# Patient Record
Sex: FEMALE | HISPANIC OR LATINO
[De-identification: names, ages, dates, MRNs, and addresses within clinical notes are randomized per-mention and may not be internally consistent; named-entity substitution may affect disease eponyms.]

---

## 2017-08-09 PROBLEM — Z00.00 ENCOUNTER FOR PREVENTIVE HEALTH EXAMINATION: Status: ACTIVE | Noted: 2017-08-09

## 2017-09-20 ENCOUNTER — APPOINTMENT (OUTPATIENT)
Dept: PULMONOLOGY | Facility: CLINIC | Age: 62
End: 2017-09-20
Payer: COMMERCIAL

## 2017-09-20 ENCOUNTER — INPATIENT (INPATIENT)
Facility: HOSPITAL | Age: 62
LOS: 2 days | Discharge: HOME | DRG: 253 | End: 2017-09-23
Attending: INTERNAL MEDICINE | Admitting: INTERNAL MEDICINE
Payer: COMMERCIAL

## 2017-09-20 ENCOUNTER — APPOINTMENT (OUTPATIENT)
Dept: HEART AND VASCULAR | Facility: CLINIC | Age: 62
End: 2017-09-20

## 2017-09-20 VITALS
SYSTOLIC BLOOD PRESSURE: 90 MMHG | HEART RATE: 75 BPM | DIASTOLIC BLOOD PRESSURE: 60 MMHG | WEIGHT: 154 LBS | OXYGEN SATURATION: 97 %

## 2017-09-20 VITALS
WEIGHT: 154.1 LBS | HEIGHT: 63 IN | DIASTOLIC BLOOD PRESSURE: 80 MMHG | RESPIRATION RATE: 17 BRPM | HEART RATE: 65 BPM | OXYGEN SATURATION: 96 % | TEMPERATURE: 98 F | SYSTOLIC BLOOD PRESSURE: 126 MMHG

## 2017-09-20 VITALS
OXYGEN SATURATION: 97 % | SYSTOLIC BLOOD PRESSURE: 90 MMHG | DIASTOLIC BLOOD PRESSURE: 60 MMHG | HEART RATE: 75 BPM | WEIGHT: 154 LBS

## 2017-09-20 DIAGNOSIS — J47.9 BRONCHIECTASIS, UNCOMPLICATED: ICD-10-CM

## 2017-09-20 DIAGNOSIS — R58 HEMORRHAGE, NOT ELSEWHERE CLASSIFIED: ICD-10-CM

## 2017-09-20 DIAGNOSIS — I27.2 OTHER SECONDARY PULMONARY HYPERTENSION: ICD-10-CM

## 2017-09-20 DIAGNOSIS — R04.2 HEMOPTYSIS: ICD-10-CM

## 2017-09-20 DIAGNOSIS — Z29.9 ENCOUNTER FOR PROPHYLACTIC MEASURES, UNSPECIFIED: ICD-10-CM

## 2017-09-20 DIAGNOSIS — R63.8 OTHER SYMPTOMS AND SIGNS CONCERNING FOOD AND FLUID INTAKE: ICD-10-CM

## 2017-09-20 LAB
ALBUMIN SERPL ELPH-MCNC: 4.4 G/DL — SIGNIFICANT CHANGE UP (ref 3.3–5)
ALP SERPL-CCNC: 71 U/L — SIGNIFICANT CHANGE UP (ref 40–120)
ALT FLD-CCNC: 24 U/L — SIGNIFICANT CHANGE UP (ref 10–45)
ANION GAP SERPL CALC-SCNC: 13 MMOL/L — SIGNIFICANT CHANGE UP (ref 5–17)
APTT BLD: 28.6 SEC — SIGNIFICANT CHANGE UP (ref 27.5–37.4)
AST SERPL-CCNC: 25 U/L — SIGNIFICANT CHANGE UP (ref 10–40)
BASOPHILS NFR BLD AUTO: 0.2 % — SIGNIFICANT CHANGE UP (ref 0–2)
BILIRUB SERPL-MCNC: 0.3 MG/DL — SIGNIFICANT CHANGE UP (ref 0.2–1.2)
BLD GP AB SCN SERPL QL: NEGATIVE — SIGNIFICANT CHANGE UP
BUN SERPL-MCNC: 13 MG/DL — SIGNIFICANT CHANGE UP (ref 7–23)
CALCIUM SERPL-MCNC: 9.4 MG/DL — SIGNIFICANT CHANGE UP (ref 8.4–10.5)
CHLORIDE SERPL-SCNC: 102 MMOL/L — SIGNIFICANT CHANGE UP (ref 96–108)
CO2 SERPL-SCNC: 26 MMOL/L — SIGNIFICANT CHANGE UP (ref 22–31)
CREAT SERPL-MCNC: 0.66 MG/DL — SIGNIFICANT CHANGE UP (ref 0.5–1.3)
EOSINOPHIL NFR BLD AUTO: 3.6 % — SIGNIFICANT CHANGE UP (ref 0–6)
GLUCOSE SERPL-MCNC: 88 MG/DL — SIGNIFICANT CHANGE UP (ref 70–99)
HCT VFR BLD CALC: 38.4 % — SIGNIFICANT CHANGE UP (ref 34.5–45)
HGB BLD-MCNC: 12.6 G/DL — SIGNIFICANT CHANGE UP (ref 11.5–15.5)
INR BLD: 0.93 — SIGNIFICANT CHANGE UP (ref 0.88–1.16)
LACTATE SERPL-SCNC: 0.8 MMOL/L — SIGNIFICANT CHANGE UP (ref 0.5–2)
LYMPHOCYTES # BLD AUTO: 39.2 % — SIGNIFICANT CHANGE UP (ref 13–44)
MCHC RBC-ENTMCNC: 29.7 PG — SIGNIFICANT CHANGE UP (ref 27–34)
MCHC RBC-ENTMCNC: 32.8 G/DL — SIGNIFICANT CHANGE UP (ref 32–36)
MCV RBC AUTO: 90.6 FL — SIGNIFICANT CHANGE UP (ref 80–100)
MONOCYTES NFR BLD AUTO: 8.1 % — SIGNIFICANT CHANGE UP (ref 2–14)
NEUTROPHILS NFR BLD AUTO: 48.9 % — SIGNIFICANT CHANGE UP (ref 43–77)
PLATELET # BLD AUTO: 174 K/UL — SIGNIFICANT CHANGE UP (ref 150–400)
POTASSIUM SERPL-MCNC: 3.8 MMOL/L — SIGNIFICANT CHANGE UP (ref 3.5–5.3)
POTASSIUM SERPL-SCNC: 3.8 MMOL/L — SIGNIFICANT CHANGE UP (ref 3.5–5.3)
PROT SERPL-MCNC: 7.5 G/DL — SIGNIFICANT CHANGE UP (ref 6–8.3)
PROTHROM AB SERPL-ACNC: 10.3 SEC — SIGNIFICANT CHANGE UP (ref 9.8–12.7)
RBC # BLD: 4.24 M/UL — SIGNIFICANT CHANGE UP (ref 3.8–5.2)
RBC # FLD: 14.1 % — SIGNIFICANT CHANGE UP (ref 10.3–16.9)
RH IG SCN BLD-IMP: POSITIVE — SIGNIFICANT CHANGE UP
SODIUM SERPL-SCNC: 141 MMOL/L — SIGNIFICANT CHANGE UP (ref 135–145)
WBC # BLD: 5.3 K/UL — SIGNIFICANT CHANGE UP (ref 3.8–10.5)
WBC # FLD AUTO: 5.3 K/UL — SIGNIFICANT CHANGE UP (ref 3.8–10.5)

## 2017-09-20 PROCEDURE — 99285 EMERGENCY DEPT VISIT HI MDM: CPT

## 2017-09-20 PROCEDURE — 99245 OFF/OP CONSLTJ NEW/EST HI 55: CPT

## 2017-09-20 PROCEDURE — 99223 1ST HOSP IP/OBS HIGH 75: CPT | Mod: GC

## 2017-09-20 PROCEDURE — 71010: CPT | Mod: 26

## 2017-09-20 RX ORDER — PANTOPRAZOLE SODIUM 20 MG/1
40 TABLET, DELAYED RELEASE ORAL
Qty: 0 | Refills: 0 | Status: DISCONTINUED | OUTPATIENT
Start: 2017-09-20 | End: 2017-09-23

## 2017-09-20 RX ORDER — OMEPRAZOLE 10 MG/1
0 CAPSULE, DELAYED RELEASE ORAL
Qty: 0 | Refills: 0 | COMMUNITY

## 2017-09-20 NOTE — ED ADULT NURSE NOTE - OBJECTIVE STATEMENT
Pt presents to ED c/o cough. Pt presents to ED c/o cough. Interviewed pt with  Donaldo [id number]. Pt reports cough for at least 9 months with productive yellow sputum, pt with two episodes of blood in sputum once in January and once yesterday. Pt also reporting "shock like" pain to mid upper back, constant for months, not related to coughing per pt. Pt also endorses intermittent CP, last episode woke pt up in the middle of the night two nights ago, pain to bilateral chest walls, though none at this time. Pt has had multiple pulm work ups, sent in for further testing. Pt denies fever, chills, dizziness/lightheadedness, SOB, N/V/D, difficulty voiding. Pt presents in NAD speaking full sentences ambulatory through triage. Mask applied in triage, pt placed on airborne precautions in bed 15.

## 2017-09-20 NOTE — H&P ADULT - ATTENDING COMMENTS
[id number] used for interpretation; pt seen and examined; reviewed vs, labs, CXR   pt a/f episodes of hemoptysis occuring initially circa Jan-Feb 2017 and then recurring twice this month. pt in addition reports having productive cough. pt was dxd this year w/ PHTN and bronchiectass; pt reports on ROS having sharp pain in chest constantly (electricity i that radiates from front to back  PE findings as above except pt w/ mild bibasilar crackles on lung exam  a/p:   1. Hemoptysis: on isolation r/o TB; follow up AFBctxs and Quantiferon gold assay; follow up pulm recs, possible bronchoscopy pending  2. chest pain: pt in NAD; sxs described as constant, " electricity" like sensation  occuring in setting of pulm conditions; will check EKG, monitor for worsening sxs  3. PHTN: echo ordered  4. bronchiectasis: follow up pulm recs

## 2017-09-20 NOTE — H&P ADULT - HISTORY OF PRESENT ILLNESS
63 y/o F, Indian speaker w/ a PMHx of pulmonary hypertension and bronchiectasis (dx Jan 2017) presenting w/ a 2 day hx of hemoptysis. Pt reports that she was having a dry cough and associated left sided back pain near the interscapular region that led to having an episode of blood tinged sputum. Pt did not report having any other associated symptoms such as fever/chills, night sweats, weight loss and shortness of breath. Pt went to see Dr. Chaidez this morning who recommended pt come to the ED. Pt reports she had a similar episode in March for which she went to [facility] and had a CT scan which showed bronchiectasis (CT imaging will be uploaded by Dr. Chaidez). Pt does not recall ever having been worked up for TB or having a bronchoscopy. Pt does endorse having a grandmother who may of had TB for which she had brief contact with several years ago in [facility]. Pt does not report any recent travel except a trip to [facility] in March 2017. Denies any recent abx use or any sick contacts. Pt does not endorse any hx of tobacco use.     Currently on ROS pt denies chest pain, shortness of breath, abdominal pain, fever, chills, diarrhea/constipation, headache/dizziness.     ED Course:   T: 98.2, HR: 65, BP: 126.80, RR: 17 O2: 96% 61 y/o F, Bhutanese speaker w/ a PMHx of pulmonary hypertension and bronchiectasis (dx March 2017) presenting w/ a 2 day hx of hemoptysis. Pt reports that she was having a dry cough and associated left sided back pain near the interscapular region that led to having an episode of blood tinged sputum. Pt did not report having any other associated symptoms such as fever/chills, night sweats, weight loss and shortness of breath. Pt went to see Dr. Chaidez this morning who recommended pt come to the ED. Pt reports she had a similar episode in March for which she went to [facility] and had a CT scan which showed bronchiectasis (CT imaging will be uploaded by Dr. Chaidez). Pt does not recall ever having been worked up for TB or having a bronchoscopy. Pt does endorse having a grandmother who may of had TB for which she had brief contact with several years ago in [facility]. Pt does not report any recent travel except a trip to [facility] in March 2017. Denies any recent abx use or any sick contacts. Pt does not endorse any hx of tobacco use.     Currently on ROS pt denies chest pain, shortness of breath, abdominal pain, fever, chills, diarrhea/constipation, headache/dizziness.     ED Course:   T: 98.2, HR: 65, BP: 126/80, RR: 17 O2: 96%

## 2017-09-20 NOTE — ED PROVIDER NOTE - MEDICAL DECISION MAKING DETAILS
62F with above PMHx who p/w hemoptysis yesterday, now resolved. Seen by Pulm in office (Dr. Chaidez) and sent to ER for admission for r/o ARABELLA/TB. Pt well-appearing, VSS, no resp distress. Pt placed on airborne precautions, labs, CXR, will admit to hospitalist for further w/u. Pt stable for the floor.

## 2017-09-20 NOTE — H&P ADULT - NSHPSOCIALHISTORY_GEN_ALL_CORE
Denies tobacco  Denies alcohol  Denies IV and recreational drug use. Denies tobacco  drinks alcohol rarely   Denies IV and recreational drug use.  works as home health aide

## 2017-09-20 NOTE — H&P ADULT - PROBLEM SELECTOR PLAN 4
-Regular Diet  -Replete lytes for K>4, Mg>2 Pt w/ CT scan of chest in March 2017 showing bronchiectasis.   -awaiting CT scan to be uploaded.

## 2017-09-20 NOTE — H&P ADULT - PROBLEM SELECTOR PLAN 1
Pt an episode of Pt w/ hemoptysis x2 episodes who was advised to present to the ED. Pt had similar episode in March for which she is unsure if she had TB workup. Low concern for TB  given pt w/ normal cxray and CT scan consistent w/ Bronchiectasis. Hemoptysis likely 2/2 to ARABELLA.  -Isolation  -Sputum culture and gram stain  -f/u on CT chest imaging from March 20,2017. Dr. Chaidez will upload imaging.   -pt to likely have bronchoscopy. will make NPO at midnight. Pt w/ hemoptysis x2 episodes who was advised to present to the ED. Pt had similar episode in March for which she is unsure if she had TB workup. Low concern for TB  given pt w/ normal cxray and CT scan consistent w/ Bronchiectasis. Hemoptysis likely 2/2 to ARABELLA.  -Isolation- airborn  -Sputum culture for AFB x3 (need to 24 hours apart) and gram stain  -f/u Quantiferon gold   -f/u w/ Dr. Chaidez in AM in regards to when pt will likely have a bronch  -f/u on CT chest imaging from March 20,2017. Dr. Chaidez will upload imaging.

## 2017-09-20 NOTE — H&P ADULT - PROBLEM SELECTOR PLAN 3
Pt w/ CT scan of chest in March 2017 showing bronchiectasis.   -awaiting CT scan to be uploaded. Pt w/ reported hx of pulmonary hypertension for which she is not on any medication. Likely idiopathic vs secondary to intrinsic lung pathology.   -will obtain echo to evaluate progression of pulmonary hypertension.

## 2017-09-20 NOTE — H&P ADULT - NSHPLABSRESULTS_GEN_ALL_CORE
.  LABS:                         12.6   5.3   )-----------( 174      ( 20 Sep 2017 16:54 )             38.4     09-20    141  |  102  |  13  ----------------------------<  88  3.8   |  26  |  0.66    Ca    9.4      20 Sep 2017 16:54    TPro  7.5  /  Alb  4.4  /  TBili  0.3  /  DBili  x   /  AST  25  /  ALT  24  /  AlkPhos  71  09-20    PT/INR - ( 20 Sep 2017 16:54 )   PT: 10.3 sec;   INR: 0.93          PTT - ( 20 Sep 2017 16:54 )  PTT:28.6 sec          Lactate, Blood: 0.8 mmoL/L (09-20 @ 16:54)      RADIOLOGY, EKG & ADDITIONAL TESTS: Reviewed.

## 2017-09-20 NOTE — H&P ADULT - NSHPPHYSICALEXAM_GEN_ALL_CORE
.  VITAL SIGNS:  T(C): 36.9 (09-20-17 @ 18:12), Max: 36.9 (09-20-17 @ 18:12)  T(F): 98.5 (09-20-17 @ 18:12), Max: 98.5 (09-20-17 @ 18:12)  HR: 71 (09-20-17 @ 18:12) (65 - 71)  BP: 132/85 (09-20-17 @ 18:12) (126/80 - 132/85)  BP(mean): --  RR: 18 (09-20-17 @ 18:12) (17 - 18)  SpO2: 97% (09-20-17 @ 18:12) (96% - 97%)  Wt(kg): --    PHYSICAL EXAM:    Constitutional: WDWN resting comfortably in bed; NAD  Head: NC/AT  Eyes: PERRL, EOMI, anicteric sclera  ENT: no nasal discharge; uvula midline, no oropharyngeal erythema or exudates; MMM  Neck: supple; no JVD or thyromegaly  Respiratory: CTA B/L; no W/R/R, no retractions  Cardiac: +S1/S2; RRR; no M/R/G; PMI non-displaced  Gastrointestinal: abdomen soft, NT/ND; no rebound or guarding; +BSx4  Extremities: WWP, no clubbing or cyanosis; no peripheral edema  Musculoskeletal: NROM x4; no joint swelling, tenderness or erythema  Vascular: 2+ DP/PT pulses B/L  Neurologic: AAOx3; CNII-XII grossly intact; no focal deficits

## 2017-09-20 NOTE — H&P ADULT - PROBLEM SELECTOR PLAN 2
Pt w/ reported hx of pulmonary hypertension for which she is not on any medication. Likely idiopathic in nature.   -will obtain echo to evaluate progression of pulmonary hypertension. Pt w/ reported hx of pulmonary hypertension for which she is not on any medication. Likely idiopathic vs secondary to intrinsic lung pathology.   -will obtain echo to evaluate progression of pulmonary hypertension. appears related to pulm issues; will check EKG

## 2017-09-20 NOTE — H&P ADULT - FAMILY HISTORY
No pertinent family history in first degree relatives Grandparent  Still living? No  Family history of tuberculosis, Age at diagnosis: Age Unknown

## 2017-09-20 NOTE — H&P ADULT - ASSESSMENT
61 y/o F w/ a PMHx of pulmonary hypertension and bronchiectasis presenting w/ an episode of hemoptysis.

## 2017-09-20 NOTE — ED ADULT TRIAGE NOTE - OTHER COMPLAINTS
pt c.o bloody sputum and cough, sent from pmd for for adm to hospital r/o ARABELLA/TB. mask applied in triage.

## 2017-09-20 NOTE — ED PROVIDER NOTE - OBJECTIVE STATEMENT
62F with h/o PHTN and bronchiectasis who was sent in by Dr. Chaidez for hemoptysis and r/o ARABELLA/TB. Pt states that last January pt had a sharp pain in her back followed by coughing up blood and clots. SHe was worked up at OSH. Yesterday, pt has cough with blood tinged sputum which has resolved today. no CP or SOb. +exposure to GM who reportedly  form TB.  Pt was sent in by Dr. Chaidez for admission, she gave him a CD of a CT which she has had done and which will be uploaded.

## 2017-09-21 DIAGNOSIS — R07.89 OTHER CHEST PAIN: ICD-10-CM

## 2017-09-21 LAB
ANION GAP SERPL CALC-SCNC: 11 MMOL/L — SIGNIFICANT CHANGE UP (ref 5–17)
BUN SERPL-MCNC: 13 MG/DL — SIGNIFICANT CHANGE UP (ref 7–23)
CALCIUM SERPL-MCNC: 9.6 MG/DL — SIGNIFICANT CHANGE UP (ref 8.4–10.5)
CHLORIDE SERPL-SCNC: 104 MMOL/L — SIGNIFICANT CHANGE UP (ref 96–108)
CO2 SERPL-SCNC: 26 MMOL/L — SIGNIFICANT CHANGE UP (ref 22–31)
CREAT SERPL-MCNC: 0.74 MG/DL — SIGNIFICANT CHANGE UP (ref 0.5–1.3)
GLUCOSE SERPL-MCNC: 89 MG/DL — SIGNIFICANT CHANGE UP (ref 70–99)
HCT VFR BLD CALC: 39.9 % — SIGNIFICANT CHANGE UP (ref 34.5–45)
HGB BLD-MCNC: 13 G/DL — SIGNIFICANT CHANGE UP (ref 11.5–15.5)
MAGNESIUM SERPL-MCNC: 2.2 MG/DL — SIGNIFICANT CHANGE UP (ref 1.6–2.6)
MCHC RBC-ENTMCNC: 29.9 PG — SIGNIFICANT CHANGE UP (ref 27–34)
MCHC RBC-ENTMCNC: 32.6 G/DL — SIGNIFICANT CHANGE UP (ref 32–36)
MCV RBC AUTO: 91.7 FL — SIGNIFICANT CHANGE UP (ref 80–100)
PHOSPHATE SERPL-MCNC: 3.8 MG/DL — SIGNIFICANT CHANGE UP (ref 2.5–4.5)
PLATELET # BLD AUTO: 175 K/UL — SIGNIFICANT CHANGE UP (ref 150–400)
POTASSIUM SERPL-MCNC: 4.2 MMOL/L — SIGNIFICANT CHANGE UP (ref 3.5–5.3)
POTASSIUM SERPL-SCNC: 4.2 MMOL/L — SIGNIFICANT CHANGE UP (ref 3.5–5.3)
RBC # BLD: 4.35 M/UL — SIGNIFICANT CHANGE UP (ref 3.8–5.2)
RBC # FLD: 14.3 % — SIGNIFICANT CHANGE UP (ref 10.3–16.9)
SODIUM SERPL-SCNC: 141 MMOL/L — SIGNIFICANT CHANGE UP (ref 135–145)
WBC # BLD: 4.7 K/UL — SIGNIFICANT CHANGE UP (ref 3.8–10.5)
WBC # FLD AUTO: 4.7 K/UL — SIGNIFICANT CHANGE UP (ref 3.8–10.5)

## 2017-09-21 PROCEDURE — 93306 TTE W/DOPPLER COMPLETE: CPT | Mod: 26

## 2017-09-21 PROCEDURE — 71250 CT THORAX DX C-: CPT | Mod: 26

## 2017-09-21 PROCEDURE — 93010 ELECTROCARDIOGRAM REPORT: CPT

## 2017-09-21 PROCEDURE — 99232 SBSQ HOSP IP/OBS MODERATE 35: CPT

## 2017-09-21 RX ORDER — ACETAMINOPHEN 500 MG
650 TABLET ORAL EVERY 6 HOURS
Qty: 0 | Refills: 0 | Status: DISCONTINUED | OUTPATIENT
Start: 2017-09-21 | End: 2017-09-23

## 2017-09-21 RX ORDER — OXYCODONE AND ACETAMINOPHEN 5; 325 MG/1; MG/1
1 TABLET ORAL EVERY 6 HOURS
Qty: 0 | Refills: 0 | Status: DISCONTINUED | OUTPATIENT
Start: 2017-09-21 | End: 2017-09-23

## 2017-09-21 RX ADMIN — Medication 650 MILLIGRAM(S): at 03:54

## 2017-09-21 RX ADMIN — PANTOPRAZOLE SODIUM 40 MILLIGRAM(S): 20 TABLET, DELAYED RELEASE ORAL at 07:58

## 2017-09-21 RX ADMIN — OXYCODONE AND ACETAMINOPHEN 1 TABLET(S): 5; 325 TABLET ORAL at 18:18

## 2017-09-21 RX ADMIN — Medication 650 MILLIGRAM(S): at 13:45

## 2017-09-21 RX ADMIN — Medication 650 MILLIGRAM(S): at 12:20

## 2017-09-21 RX ADMIN — OXYCODONE AND ACETAMINOPHEN 1 TABLET(S): 5; 325 TABLET ORAL at 19:46

## 2017-09-21 RX ADMIN — Medication 650 MILLIGRAM(S): at 05:00

## 2017-09-21 NOTE — PROGRESS NOTE ADULT - PROBLEM SELECTOR PLAN 1
- With hemoptysis. Probably related to chronic infection. possible ARABELLA, less likely MTB.  - She is clinically stable.  - Will obtain a CT with hemoptysis protocol to localise the area of concern. Followed by embolization after reviewing the images.  - sputum for afb x3.

## 2017-09-21 NOTE — PROGRESS NOTE ADULT - SUBJECTIVE AND OBJECTIVE BOX
Interval Events:  Patient seen and examined at bedside.        MEDICATIONS:  Pulmonary:    Antimicrobials:    Anticoagulants:    Cardiac:      Allergies    No Known Allergies    Intolerances        Vital Signs Last 24 Hrs  T(C): 36.8 (21 Sep 2017 05:17), Max: 36.9 (20 Sep 2017 18:12)  T(F): 98.2 (21 Sep 2017 05:17), Max: 98.5 (20 Sep 2017 18:12)  HR: 61 (21 Sep 2017 05:17) (61 - 71)  BP: 111/72 (21 Sep 2017 05:17) (99/65 - 132/85)  BP(mean): --  RR: 15 (21 Sep 2017 05:17) (15 - 18)  SpO2: 96% (21 Sep 2017 05:17) (96% - 97%)          LABS:      CBC Full  -  ( 21 Sep 2017 06:36 )  WBC Count : 4.7 K/uL  Hemoglobin : 13.0 g/dL  Hematocrit : 39.9 %  Platelet Count - Automated : 175 K/uL  Mean Cell Volume : 91.7 fL  Mean Cell Hemoglobin : 29.9 pg  Mean Cell Hemoglobin Concentration : 32.6 g/dL  Auto Neutrophil # : x  Auto Lymphocyte # : x  Auto Monocyte # : x  Auto Eosinophil # : x  Auto Basophil # : x  Auto Neutrophil % : x  Auto Lymphocyte % : x  Auto Monocyte % : x  Auto Eosinophil % : x  Auto Basophil % : x    09-21    141  |  104  |  13  ----------------------------<  89  4.2   |  26  |  0.74    Ca    9.6      21 Sep 2017 06:35  Phos  3.8     09-21  Mg     2.2     09-21    TPro  7.5  /  Alb  4.4  /  TBili  0.3  /  DBili  x   /  AST  25  /  ALT  24  /  AlkPhos  71  09-20    PT/INR - ( 20 Sep 2017 16:54 )   PT: 10.3 sec;   INR: 0.93          PTT - ( 20 Sep 2017 16:54 )  PTT:28.6 sec                RADIOLOGY & ADDITIONAL STUDIES (The following images were personally reviewed):

## 2017-09-21 NOTE — PROGRESS NOTE ADULT - SUBJECTIVE AND OBJECTIVE BOX
PGY1 PROGRESS NOTE:    OVERNIGHT EVENTS: No acute overnight events.    SUBJECTIVE / INTERVAL HPI: Patient seen and examined at bedside. Patient reporting some right sided chest pain, sharp, radiating to the pain on inspiration, 3/10, controlled with Tylenol. Patient with no hemoptysis overnight. Patient denies fevers, chills, decreased appetite, night sweats, abdominal pain, n/v/d/c.    VITAL SIGNS:  Vital Signs Last 24 Hrs  T(C): 36.8 (21 Sep 2017 05:17), Max: 36.9 (20 Sep 2017 18:12)  T(F): 98.2 (21 Sep 2017 05:17), Max: 98.5 (20 Sep 2017 18:12)  HR: 61 (21 Sep 2017 05:17) (61 - 71)  BP: 111/72 (21 Sep 2017 05:17) (99/65 - 132/85)  RR: 15 (21 Sep 2017 05:17) (15 - 18)  SpO2: 96% (21 Sep 2017 05:17) (96% - 97%)    PHYSICAL EXAM:  General: WDWN, resting in bed comfortably, NAD  HEENT: NC/AT; PERRL, anicteric sclera; MMM  Neck: supple, no LAD  Cardiovascular: +S1/S2; RRR; splitting of S2 on inspiration  Respiratory: good air entry, CTA B/L; no W/R/R  Gastrointestinal: soft, NT/ND; +BSx4  Extremities: WWP; no edema, clubbing or cyanosis  Vascular: 2+ radial, DP/PT pulses B/L  Neurological: AAOx3; no focal deficits    MEDICATIONS:  MEDICATIONS  (STANDING):  pantoprazole    Tablet 40 milliGRAM(s) Oral before breakfast    MEDICATIONS  (PRN):  acetaminophen   Tablet. 650 milliGRAM(s) Oral every 6 hours PRN Moderate Pain (4 - 6)      ALLERGIES:  No Known Allergies    LABS:                        13.0   4.7   )-----------( 175      ( 21 Sep 2017 06:36 )             39.9     09-21    141  |  104  |  13  ----------------------------<  89  4.2   |  26  |  0.74    Ca    9.6      21 Sep 2017 06:35  Phos  3.8     09-21  Mg     2.2     09-21    TPro  7.5  /  Alb  4.4  /  TBili  0.3  /  DBili  x   /  AST  25  /  ALT  24  /  AlkPhos  71  09-20    PT/INR - ( 20 Sep 2017 16:54 )   PT: 10.3 sec;   INR: 0.93       PTT - ( 20 Sep 2017 16:54 )  PTT:28.6 sec      RADIOLOGY & ADDITIONAL TESTS: Reviewed.

## 2017-09-21 NOTE — PROGRESS NOTE ADULT - PROBLEM SELECTOR PLAN 2
- Pt w/ reported hx of pulmonary hypertension for which she is not on any medication. Follows up with Dr. Chaidez outpatient.   - Likely idiopathic vs secondary to intrinsic lung pathology.   - f/u ECHO to evaluate progression of pulmonary hypertension

## 2017-09-21 NOTE — PROGRESS NOTE ADULT - PROBLEM SELECTOR PLAN 3
- Pt recent diagnosis of bronchiectasis in March 2017 based on CT chest findings, follows with Dr. Chaidez outpatient.    - f/u CT scan from March 2017

## 2017-09-21 NOTE — PROGRESS NOTE ADULT - PROBLEM SELECTOR PLAN 1
- patient presenting with two episodes of hemoptysis and pleuritic chest pain; h/o previous episode in March 2017 with unknown w/u TB.  - Patient alsow with recent diagnosis of bronchiestasis in [facility] 2017  - CXR unremarkable  - CT scan consistent with bronchiectasis  - Hemoptysis likely 2/2 bronchiectasis; although TB cannot be ruled out given pt with previous contact with TB positive person and travel to TB endemic area in March 2017, when pt had first episode of hemoptysis.  - Airborn Isolation  - Sputum culture for AFB x3 (need to 24 hours apart) and gram stain  - f/u Quantiferon gold   - f/u Dr. Chaidez in AM for possible bronch  - f/u on CT chest imaging from March 2017 (Dr. Chaidez will upload imaging)

## 2017-09-22 ENCOUNTER — TRANSCRIPTION ENCOUNTER (OUTPATIENT)
Age: 62
End: 2017-09-22

## 2017-09-22 LAB
HCV AB S/CO SERPL IA: 0.1 S/CO — SIGNIFICANT CHANGE UP
HCV AB SERPL-IMP: SIGNIFICANT CHANGE UP
NIGHT BLUE STAIN TISS: SIGNIFICANT CHANGE UP
NIGHT BLUE STAIN TISS: SIGNIFICANT CHANGE UP
SPECIMEN SOURCE: SIGNIFICANT CHANGE UP
SPECIMEN SOURCE: SIGNIFICANT CHANGE UP

## 2017-09-22 PROCEDURE — 36215 PLACE CATHETER IN ARTERY: CPT | Mod: RT

## 2017-09-22 PROCEDURE — 37244 VASC EMBOLIZE/OCCLUDE BLEED: CPT

## 2017-09-22 PROCEDURE — 99232 SBSQ HOSP IP/OBS MODERATE 35: CPT

## 2017-09-22 PROCEDURE — 71275 CT ANGIOGRAPHY CHEST: CPT | Mod: 26

## 2017-09-22 PROCEDURE — 99233 SBSQ HOSP IP/OBS HIGH 50: CPT

## 2017-09-22 RX ORDER — SODIUM CHLORIDE 9 MG/ML
5 INJECTION INTRAMUSCULAR; INTRAVENOUS; SUBCUTANEOUS ONCE
Qty: 0 | Refills: 0 | Status: DISCONTINUED | OUTPATIENT
Start: 2017-09-22 | End: 2017-09-23

## 2017-09-22 RX ADMIN — PANTOPRAZOLE SODIUM 40 MILLIGRAM(S): 20 TABLET, DELAYED RELEASE ORAL at 07:05

## 2017-09-22 NOTE — DISCHARGE NOTE ADULT - HOSPITAL COURSE
63 y/o F, Polish speaker w/ a PMHx of pulmonary hypertension and bronchiectasis (dx March 2017) presenting from pulmonologist office (Dr. Aceves) with a 2 day hx of cough with blood tinged sputum. Pt did not report having any other associated symptoms such as fever/chills, night sweats, weight loss and shortness of breath. On initial presentation, pt was HDS with benign physical exam findings. Labs were wnl without signs of infection or anemia. CXR was unremarkable. [facility]. Patient was placed on airborne isolation. Sputum AFB which was negative x3. Serum Quantiferon gold was negative. Patient had CT chest w/o contrast which showed marked volume loss of the right upper lobe with cystic bronchiectasis, patent central airways without evidence of endobronchial or   endotracheal lesions or blood, and aneurysmal dilatation of the aortic isthmus measuring up to 3.6 cm. Patient also had an ECHO for pHTN and was found to have normal LV function with LVEF of 55% and pulmonary artery systolic pressure estimated to be 20 mmHg. Patient then had CT chest with hemoptysis protocol for further evaluation of the pulmonary vessels and to localise the area of concern, followed by embolization.________ Patient remained without further episodes of hemoptysis. Patient was clinically and hemodynamically stable for discharge on _____ with instructions to follow-up with pulmonology for further work-up of hemoptysis/ bronchiectasis and management of pHTN. 63 y/o F, Gabonese speaker w/ a PMHx of pulmonary hypertension and bronchiectasis (dx March 2017) presenting from pulmonologist office (Dr. Aceves) with a 2 day hx of cough with blood tinged sputum. Pt did not report having any other associated symptoms such as fever/chills, night sweats, weight loss and shortness of breath. On initial presentation, pt was HDS with benign physical exam findings. Labs were wnl without signs of infection or anemia. CXR was unremarkable. Patient was admitted to the [facility] for r/o TB. Patient was placed on airborne isolation. Sputum AFB which was negative x3. Serum Quantiferon gold was sent. Patient had CT chest w/o contrast which showed marked volume loss of the right upper lobe with cystic bronchiectasis, patent central airways without evidence of endobronchial or endotracheal lesions or blood, and aneurysmal dilatation of the aortic isthmus measuring up to 3.6 cm. Patient also had an ECHO for pHTN and was found to have normal LV function with LVEF of 55% and pulmonary artery systolic pressure estimated to be 20 mmHg. Patient then had CTA chest with hemoptysis protocol for further evaluation of the pulmonary vessels and to localise the area of concern, followed by embolization in the RUL. Patient remained without further episodes of hemoptysis. Patient was clinically and hemodynamically stable for discharge on 9/23 with instructions to follow-up with Dr. Aceves (Pulmonology) for further work-up of hemoptysis/ bronchiectasis and management of pHTN.

## 2017-09-22 NOTE — DISCHARGE NOTE ADULT - MEDICATION SUMMARY - MEDICATIONS TO TAKE
I will START or STAY ON the medications listed below when I get home from the hospital:    omeprazole  -- Indication: For GERD I will START or STAY ON the medications listed below when I get home from the hospital:    oxyCODONE-acetaminophen 5 mg-325 mg oral tablet  -- 1 tab(s) by mouth every 6 hours, As needed, Severe Pain (7 - 10) MDD:4 tabs  -- Indication: For Pain    omeprazole  -- Indication: For GERD

## 2017-09-22 NOTE — PROGRESS NOTE ADULT - PROBLEM SELECTOR PLAN 1
- With hemoptysis. Probably related to chronic infection. possible ARABELLA, less likely MTB.  - She is clinically stable.  - CT yesterday unfortunately was not done with IV contrast. Case was dw Dr Gonzales and will proceed with an angiography and possible embolization of the RUL.  - sputum for AFB is pending.

## 2017-09-22 NOTE — DISCHARGE NOTE ADULT - PATIENT PORTAL LINK FT
“You can access the [facility] Patient Portal, offered by [facility], by registering with the following website: [email or web address]”

## 2017-09-22 NOTE — PROGRESS NOTE ADULT - PROBLEM SELECTOR PLAN 1
- patient presenting with two episodes of hemoptysis and pleuritic chest pain; h/o previous episode in March 2017 with unknown w/u TB.  - Patient alsow with recent diagnosis of bronchiestasis in [facility] 2017  - CXR unremarkable  - CT scan consistent with bronchiectasis  - Hemoptysis likely 2/2 bronchiectasis; although TB cannot be ruled out given pt with previous contact with TB positive person and travel to TB endemic area in March 2017, when pt had first episode of hemoptysis.  - Airborn Isolation  - Sputum culture for AFB x3 (need to 24 hours apart) and gram stain  - f/u Quantiferon gold   - f/u Dr. Chaidez in AM for possible bronch  - f/u on CT chest imaging from March 2017 (Dr. Chaidez will upload imaging) - patient presenting with two episodes of hemoptysis and pleuritic chest pain; h/o previous episode in March 2017 with unknown w/u for TB.   - Hemoptysis likely 2/2 bronchiectasis vs ARABELLA; although TB cannot be ruled out given pt with previous contact with TB positive person and travel to TB endemic area in March 2017, when pt had first episode of hemoptysis.  - Patient also w with recent diagnosis of bronchiectasis and pHTN in March 2017  - CXR unremarkable  - CT scan consistent with bronchiectasis; although suboptimal study for vessels  - plan for IR bronchial artery embolization study to further evaluate pulmonary vasculature for source of bleeding  - Airborne Isolation  - f/u Sputum cxs for AFB x3 and gram stain- obtained, results pending  - f/u Quantiferon gold - patient presenting with two episodes of hemoptysis and pleuritic chest pain; h/o previous episode in March 2017 with unknown w/u for TB.   - Hemoptysis likely 2/2 bronchiectasis vs ARABELLA; although TB cannot be ruled out given pt with previous contact with TB positive person and travel to TB endemic area in March 2017, when pt had first episode of hemoptysis.  - Patient also w with recent diagnosis of bronchiectasis and pHTN in March 2017  - CXR unremarkable  - CT scan consistent with bronchiectasis; although suboptimal study for vessels  - plan for IR bronchial artery embolization study to further evaluate pulmonary vasculature for source of bleeding  - Airborne Isolation  - f/u sputum AFB x3 and gram stain- AFB negative x1, f/u other two  - f/u Quantiferon gold  - bcxs negative

## 2017-09-22 NOTE — DISCHARGE NOTE ADULT - OTHER SIGNIFICANT FINDINGS
EXAM:  CT CHEST                          PROCEDURE DATE:  09/21/2017                     INTERPRETATION:      CT of the CHEST without intravenous contrast dated 9/21/2017 3:13 PM    INDICATION: hemoptysis    TECHNIQUE: CT scan of chest was performed from the lung apices through   the lung bases. Axial, coronal and sagittal reformatted images and axial   maximum intensity projection images (MIPS) were reviewed. Intravenous   contrast was not administered.    PRIOR STUDIES: Imported CT chest dated 3/20/2017    FINDINGS:    LUNGS:  Evaluation of the pulmonary parenchyma demonstrates marked volume   loss of the right upper lobe with associated cystic bronchiectasis within   the right upper lobe, similar as compared to 3/20/2017. Linear   atelectasis is also noted within the right upper lobe and right lower   lobe. Evaluation of the central airways demonstrates no endotracheal or   endobronchial lesions or blood. There is thickening of an accessory   fissure. No pleural effusions are seen.    MEDIASTINUM: The heart is normal in size.  No pericardial effusion is   seen.  Evaluation of the vasculature is limited without intravenous   contrast, but the great vessels are grossly unremarkable. This there is   dilation of the aortic isthmus measuring up to 3.6 cm, with calcified   aneurysm and/or ductus bump at the inferior margin, unchanged. Scant   calcified atheromatous plaque is seen within the visualized aorta.    Evaluation for adenopathy is limited without intravenous contrast,   however no gross mediastinal, hilar, axillary or supraclavicular   lymphadenopathy is identified.    UPPER ABDOMEN, SOFT TISSUES AND BONES: Limited evaluation of the upper   abdomen demonstrates a 5.5 cm hypodensity within hepatic segment 6, which   is unchanged from the prior examination and likely a hepatic cyst. No   other upper abdominal abnormality is seen. The stomach is visible with   particulate material. No evidence of acute upper gastrointestinal bleed   within the visualized upper abdomen. Evaluation of the osseous structures   demonstrates mild to moderate degenerative changes.    IMPRESSION:  1.  Marked volume loss of the right upper lobe with associated cystic   bronchiectasis as above, similar as compared to 3/20/2017.  2.  Patent central airways without evidence of endobronchial or   endotracheal lesions or blood.  3.  Aneurysmal dilatation of the aortic isthmus measuring up to 3.6 cm,   with a calcified aneurysm and/or ductus bump at the inferior margin,   unchanged.

## 2017-09-22 NOTE — DISCHARGE NOTE ADULT - CARE PROVIDER_API CALL
Urban Aceves), Critical Care Medicine; Internal Medicine; Pulmonary Disease  [address] [id number]  Phone: [phone]  Fax: [phone]

## 2017-09-22 NOTE — PROGRESS NOTE ADULT - PROBLEM SELECTOR PLAN 3
- Pt recent diagnosis of bronchiectasis in March 2017 based on CT chest findings, follows with Dr. Chaidez outpatient.    - f/u CT scan from March 2017 - Pt recent diagnosis of bronchiectasis in March 2017 based on CT chest findings, follows with Dr. Chaidez outpatient.    - CT scan showing marked volume loss of the right upper lobe with associated cystic bronchiectasis. (no previous inpatient imaging for comparison)  - Bronchiectasis could be a source of hemoptysis, possibly 2/2 ARABELLA vs unlikely TB  - continue with hemoptysis w/u as above

## 2017-09-22 NOTE — PROGRESS NOTE ADULT - SUBJECTIVE AND OBJECTIVE BOX
PGY1 PROGRESS NOTE:    OVERNIGHT EVENTS: No acute overnight events.    SUBJECTIVE / INTERVAL HPI: Three sputums were collected from patient thus far. Patient seen and examined at bedside.     VITAL SIGNS:  Vital Signs Last 24 Hrs  T(C): 36.1 (22 Sep 2017 05:45), Max: 36.8 (21 Sep 2017 21:10)  T(F): 96.9 (22 Sep 2017 05:45), Max: 98.2 (21 Sep 2017 21:10)  HR: 60 (22 Sep 2017 05:45) (60 - 71)  BP: 94/61 (22 Sep 2017 05:45) (93/55 - 142/64)  RR: 14 (22 Sep 2017 05:45) (14 - 16)  SpO2: 94% (22 Sep 2017 05:45) (93% - 96%)    PHYSICAL EXAM:  General: WDWN, resting in bed comfortably, NAD  HEENT: NC/AT; PERRL, anicteric sclera; MMM  Neck: supple, no LAD  Cardiovascular: +S1/S2; RRR; splitting of S2 on inspiration  Respiratory: good air entry, CTA B/L; no W/R/R  Gastrointestinal: soft, NT/ND; +BSx4  Extremities: WWP; no edema, clubbing or cyanosis  Vascular: 2+ radial, DP/PT pulses B/L  Neurological: AAOx3; no focal deficits    MEDICATIONS:  MEDICATIONS  (STANDING):  pantoprazole    Tablet 40 milliGRAM(s) Oral before breakfast    MEDICATIONS  (PRN):  acetaminophen   Tablet. 650 milliGRAM(s) Oral every 6 hours PRN Moderate Pain (4 - 6)  oxyCODONE    5 mG/acetaminophen 325 mG 1 Tablet(s) Oral every 6 hours PRN Severe Pain (7 - 10)    ALLERGIES:  No Known Allergies    LABS:                    RADIOLOGY & ADDITIONAL TESTS: Reviewed. PGY1 PROGRESS NOTE:    OVERNIGHT EVENTS: No acute overnight events.    SUBJECTIVE / INTERVAL HPI: Three sputums were collected from patient thus far. Patient seen and examined at bedside. Patient still has a cough productive of sputum but not blood. Also still c/o right sided chest pain on inspiration which is controlled with the Tylenol and Percocet. Patient denies fevers, chills, night sweats, shortness of breath, abdominal pain, n/v/d/c.    VITAL SIGNS:  Vital Signs Last 24 Hrs  T(C): 36.1 (22 Sep 2017 05:45), Max: 36.8 (21 Sep 2017 21:10)  T(F): 96.9 (22 Sep 2017 05:45), Max: 98.2 (21 Sep 2017 21:10)  HR: 60 (22 Sep 2017 05:45) (60 - 71)  BP: 94/61 (22 Sep 2017 05:45) (93/55 - 142/64)  RR: 14 (22 Sep 2017 05:45) (14 - 16)  SpO2: 94% (22 Sep 2017 05:45) (93% - 96%)    PHYSICAL EXAM:  General: WDWN, resting in bed comfortably, NAD  HEENT: NC/AT; PERRL, anicteric sclera; MMM  Neck: supple, no LAD  Cardiovascular: +S1/S2; RRR; splitting of S2 on inspiration  Respiratory: good air entry, CTA B/L; no W/R/R  Gastrointestinal: soft, NT/ND; +BSx4  Extremities: WWP; no edema, clubbing or cyanosis  Vascular: 2+ radial, DP/PT pulses B/L  Neurological: AAOx3; no focal deficits    MEDICATIONS:  MEDICATIONS  (STANDING):  pantoprazole    Tablet 40 milliGRAM(s) Oral before breakfast    MEDICATIONS  (PRN):  acetaminophen   Tablet. 650 milliGRAM(s) Oral every 6 hours PRN Moderate Pain (4 - 6)  oxyCODONE    5 mG/acetaminophen 325 mG 1 Tablet(s) Oral every 6 hours PRN Severe Pain (7 - 10)    ALLERGIES:  No Known Allergies    LABS:                                   13.0   4.7   )-----------( 175      ( 21 Sep 2017 06:36 )             39.9     09-21    141  |  104  |  13  ----------------------------<  89  4.2   |  26  |  0.74    Ca    9.6      21 Sep 2017 06:35  Phos  3.8     09-21  Mg     2.2     09-21    TPro  7.5  /  Alb  4.4  /  TBili  0.3  /  DBili  x   /  AST  25  /  ALT  24  /  AlkPhos  71  09-20           RADIOLOGY & ADDITIONAL TESTS: Reviewed.

## 2017-09-22 NOTE — DISCHARGE NOTE ADULT - PLAN OF CARE
Monitor for signs and symptoms of further hemoptysis. You came to the hospital after two episodes of blood while coughing. You have a history of this occurring in the past with unknown TB workup at that time. You have history of exposure to someone with active TB as well as travel to a TB endemic area within the last few months. You were evaluated for TB during you stay. You were placed on isolation, and had your blood and sputum studied for signs of TB infection which were all negative. You also had CT imaging of your chest during you stay showing no signs of TB infection. Continue to follow-up with pulmonology. You have a known diagnosis of bronchiectasis based on imaging earlier this year, which is permanent enlargement of parts of the airways of the lung. This can be caused by chronic inflammation or infection. You had CT chest imaging during you stay that was again consistent with bronchiectasis. You were worked-up for tuberculosis during your stay as a potential cause of this lung condition. You were found to be negative for TB. Please follow-up with your pulmonologist outpatient for further work-up. You have a known diagnosis of pulmonary hypertension from imaging earlier this year. This is a type of high blood pressure that affects arteries in the lungs and in the heart. Please follow-up with your pulmonologist outpatient for further management. You have a known diagnosis of bronchiectasis based on imaging earlier this year, which is permanent enlargement of parts of the airways of the lung. This can be caused by chronic inflammation or infection. You had CT chest imaging during you stay that was again consistent with bronchiectasis. You were worked-up for tuberculosis during your stay as a potential cause of this lung condition. You were found to be negative for TB. Please follow-up with your pulmonologist (Dr. Aceves) outpatient for further work-up, as this condition is caused by chronic inflammation and could be due to an undiagnosed underlying infection. You came to the hospital after two episodes of blood while coughing. You have a history of this occurring in the past with unknown TB workup at that time. You have history of exposure to someone with active TB as well as travel to a TB endemic area within the last few months. You were evaluated for TB during you stay. You were placed on isolation, and had your blood and sputum studied for signs of TB infection which were all negative. You also had CT imaging of your chest during you stay showing no signs of TB infection. You had a angiogram to evaluate the vessels in the lung; a blood vessel was localized as the potential source of bleeding and was embolized to prevent further bleeding. Please follow-up with your pulmonologist (Dr. Aceves) in 1-2 weeks. Seek immediate medical attention if your have significant blood while coughing or shortness of breath.

## 2017-09-22 NOTE — DISCHARGE NOTE ADULT - CARE PLAN
Principal Discharge DX:	Hemoptysis  Goal:	Monitor for signs and symptoms of further hemoptysis.  Instructions for follow-up, activity and diet:	You came to the hospital after two episodes of blood while coughing. You have a history of this occurring in the past with unknown TB workup at that time. You have history of exposure to someone with active TB as well as travel to a TB endemic area within the last few months. You were evaluated for TB during you stay. You were placed on isolation, and had your blood and sputum studied for signs of TB infection which were all negative. You also had CT imaging of your chest during you stay showing no signs of TB infection.  Secondary Diagnosis:	Bronchiectasis  Goal:	Continue to follow-up with pulmonology.  Instructions for follow-up, activity and diet:	You have a known diagnosis of bronchiectasis based on imaging earlier this year, which is permanent enlargement of parts of the airways of the lung. This can be caused by chronic inflammation or infection. You had CT chest imaging during you stay that was again consistent with bronchiectasis. You were worked-up for tuberculosis during your stay as a potential cause of this lung condition. You were found to be negative for TB. Please follow-up with your pulmonologist outpatient for further work-up.  Secondary Diagnosis:	Pulmonary hypertension  Goal:	Continue to follow-up with pulmonology.  Instructions for follow-up, activity and diet:	You have a known diagnosis of pulmonary hypertension from imaging earlier this year. This is a type of high blood pressure that affects arteries in the lungs and in the heart. Please follow-up with your pulmonologist outpatient for further management. Principal Discharge DX:	Hemoptysis  Goal:	Monitor for signs and symptoms of further hemoptysis.  Instructions for follow-up, activity and diet:	You came to the hospital after two episodes of blood while coughing. You have a history of this occurring in the past with unknown TB workup at that time. You have history of exposure to someone with active TB as well as travel to a TB endemic area within the last few months. You were evaluated for TB during you stay. You were placed on isolation, and had your blood and sputum studied for signs of TB infection which were all negative. You also had CT imaging of your chest during you stay showing no signs of TB infection. You had a angiogram to evaluate the vessels in the lung; a blood vessel was localized as the potential source of bleeding and was embolized to prevent further bleeding. Please follow-up with your pulmonologist (Dr. Aceves) in 1-2 weeks. Seek immediate medical attention if your have significant blood while coughing or shortness of breath.  Secondary Diagnosis:	Bronchiectasis  Goal:	Continue to follow-up with pulmonology.  Instructions for follow-up, activity and diet:	You have a known diagnosis of bronchiectasis based on imaging earlier this year, which is permanent enlargement of parts of the airways of the lung. This can be caused by chronic inflammation or infection. You had CT chest imaging during you stay that was again consistent with bronchiectasis. You were worked-up for tuberculosis during your stay as a potential cause of this lung condition. You were found to be negative for TB. Please follow-up with your pulmonologist (Dr. Aceves) outpatient for further work-up, as this condition is caused by chronic inflammation and could be due to an undiagnosed underlying infection.  Secondary Diagnosis:	Pulmonary hypertension  Goal:	Continue to follow-up with pulmonology.  Instructions for follow-up, activity and diet:	You have a known diagnosis of pulmonary hypertension from imaging earlier this year. This is a type of high blood pressure that affects arteries in the lungs and in the heart. Please follow-up with your pulmonologist outpatient for further management.

## 2017-09-22 NOTE — PROGRESS NOTE ADULT - SUBJECTIVE AND OBJECTIVE BOX
Interval Events:  Patient seen and examined at bedside.        MEDICATIONS:  Pulmonary:  sodium chloride 3%  Inhalation 5 milliLiter(s) Inhalation once    Antimicrobials:    Anticoagulants:    Cardiac:      Allergies    No Known Allergies    Intolerances        Vital Signs Last 24 Hrs  T(C): 36.2 (22 Sep 2017 10:59), Max: 36.8 (21 Sep 2017 21:10)  T(F): 97.1 (22 Sep 2017 10:59), Max: 98.2 (21 Sep 2017 21:10)  HR: 67 (22 Sep 2017 10:59) (60 - 71)  BP: 106/71 (22 Sep 2017 10:59) (93/55 - 106/71)  BP(mean): --  RR: 16 (22 Sep 2017 10:59) (14 - 16)  SpO2: 94% (22 Sep 2017 10:59) (93% - 95%)          LABS:      CBC Full  -  ( 21 Sep 2017 06:36 )  WBC Count : 4.7 K/uL  Hemoglobin : 13.0 g/dL  Hematocrit : 39.9 %  Platelet Count - Automated : 175 K/uL  Mean Cell Volume : 91.7 fL  Mean Cell Hemoglobin : 29.9 pg  Mean Cell Hemoglobin Concentration : 32.6 g/dL  Auto Neutrophil # : x  Auto Lymphocyte # : x  Auto Monocyte # : x  Auto Eosinophil # : x  Auto Basophil # : x  Auto Neutrophil % : x  Auto Lymphocyte % : x  Auto Monocyte % : x  Auto Eosinophil % : x  Auto Basophil % : x    09-21    141  |  104  |  13  ----------------------------<  89  4.2   |  26  |  0.74    Ca    9.6      21 Sep 2017 06:35  Phos  3.8     09-21  Mg     2.2     09-21    TPro  7.5  /  Alb  4.4  /  TBili  0.3  /  DBili  x   /  AST  25  /  ALT  24  /  AlkPhos  71  09-20    PT/INR - ( 20 Sep 2017 16:54 )   PT: 10.3 sec;   INR: 0.93          PTT - ( 20 Sep 2017 16:54 )  PTT:28.6 sec                RADIOLOGY & ADDITIONAL STUDIES (The following images were personally reviewed):

## 2017-09-22 NOTE — PROGRESS NOTE ADULT - PROBLEM SELECTOR PLAN 2
- Pt w/ reported hx of pulmonary hypertension for which she is not on any medication. Follows up with Dr. Chaidez outpatient.   - Likely idiopathic vs secondary to intrinsic lung pathology.   - f/u ECHO to evaluate progression of pulmonary hypertension - Pt w/ reported hx of pulmonary hypertension for which she is not on any medication. Follows up with Dr. Chaidez outpatient.   - Likely idiopathic vs secondary to intrinsic lung pathology; less likely Category 2 pHTN   - ECHO showing normal LV, LVEF 55%, pHTN with pulmonary artery   systolic pressure estimated to be 20 mmHg  - outpatient management

## 2017-09-22 NOTE — PROGRESS NOTE ADULT - ATTENDING COMMENTS
61y/o woman hx of bronchiectasis.  Pulmonary PH suspected due to elevated PAS on ECHO.  The patient had hemoptysis in March 2017, which, as described by her, sounds significant-napkin was soaked with blood.  She was admitted to [facility] and had some work up done, including a CT scan.   CT showed focal RUL cystic lung disease and cylindrical bronchiectasis. The cysts are thin walled with very minimal surrounding lung parenchymal reaction.   I suspect that the patient had a chronic infection such as ARABELLA or aspergillosis (less likely) or other fungal infection. This may have resulted in hyperplastic bronchial circulation to the affected site.   We will need to find out the cause of RUL bronchiectasis and to ensure that any lingering infection like ARABELLA is treated  We will get a CT chest (hemoptysis protocol) to see if any bronchial vessels can be delineated.  Finally, we will need to decide with the patient if embolization or surgical resection of the focal parenchymal disease is more appropriate
I have had detailed discussions with Dr. Sandeep Lim, Dr. Gonzales and Dr. Barron, whose input and follow up are deeply appreciated.  The patient has not had any further hemoptysis since being admitted to the hospital. Dr. Lim will get back to us about the frequency of pneumonia and pulmonary infections in the last 1 year. This is important to decide if the focal bronchiectasis in the RUL is resulting in multiple infections. If so, we have to consider the option of surgical resection of the focal diseased area.  In the meantime, we have procured a second CT-angiogram. Dr. Gonzales performed a pulmonary angiogram and was able to successfully embolize a bronchial artery that appears to be supplying the bronchiectactic segments.  Plan:  1. Further history of infections to be obtained from Raphael Escobedo. I have contacted him  2. Observe for any recurrent bleeding  3. R/O for AFB in sputum smears  4. Patient can be discharged from the hospital. We will see if she has ARABELLA in the sputum. If so, she would need to be treated.
Pt seen and examined; VSS, exam with RRR, CTAB, abd soft, no LE edema   63 yo F with  1. Hemoptysis- on isolation r/o TB; follow up AFBs (1/3 sent) and quantiferon gold assay.  For CT to eval for bleeding. No broch at this time  2. Chest pain- likely related to cough and bronchiectasis  3. PHTN: echo ordered and pending  4. Bronchiectasis- further w/o pending above, may be 2/2 ARABELLA infection?  Spent time explaining TB and plan to pt and her family this AM
Patient was seen and examined by me at bedside. I agree with resident's note, subjective, objective physical exam, assessment and plan with following modifications/additions.     1) Hemoptysis  2) Bronchiectasis  3) Pulm HTN? --However PA pressures ~20mmHg.    Plan: 3 stes of AFB sent. So far 1 negative, other 2 are pending results. Gold QF was sent, pending result. CT w/o contrast WNL. Will go for CTA with IV contrast. Pulm recs appreciated.

## 2017-09-23 VITALS
DIASTOLIC BLOOD PRESSURE: 55 MMHG | SYSTOLIC BLOOD PRESSURE: 115 MMHG | TEMPERATURE: 98 F | RESPIRATION RATE: 14 BRPM | OXYGEN SATURATION: 96 % | HEART RATE: 88 BPM

## 2017-09-23 LAB
ANION GAP SERPL CALC-SCNC: 14 MMOL/L — SIGNIFICANT CHANGE UP (ref 5–17)
BUN SERPL-MCNC: 13 MG/DL — SIGNIFICANT CHANGE UP (ref 7–23)
CALCIUM SERPL-MCNC: 9.5 MG/DL — SIGNIFICANT CHANGE UP (ref 8.4–10.5)
CHLORIDE SERPL-SCNC: 101 MMOL/L — SIGNIFICANT CHANGE UP (ref 96–108)
CO2 SERPL-SCNC: 24 MMOL/L — SIGNIFICANT CHANGE UP (ref 22–31)
CREAT SERPL-MCNC: 0.63 MG/DL — SIGNIFICANT CHANGE UP (ref 0.5–1.3)
GLUCOSE SERPL-MCNC: 147 MG/DL — HIGH (ref 70–99)
HCT VFR BLD CALC: 39.9 % — SIGNIFICANT CHANGE UP (ref 34.5–45)
HGB BLD-MCNC: 13.5 G/DL — SIGNIFICANT CHANGE UP (ref 11.5–15.5)
MAGNESIUM SERPL-MCNC: 2.1 MG/DL — SIGNIFICANT CHANGE UP (ref 1.6–2.6)
MCHC RBC-ENTMCNC: 30.6 PG — SIGNIFICANT CHANGE UP (ref 27–34)
MCHC RBC-ENTMCNC: 33.8 G/DL — SIGNIFICANT CHANGE UP (ref 32–36)
MCV RBC AUTO: 90.5 FL — SIGNIFICANT CHANGE UP (ref 80–100)
NIGHT BLUE STAIN TISS: SIGNIFICANT CHANGE UP
PLATELET # BLD AUTO: 181 K/UL — SIGNIFICANT CHANGE UP (ref 150–400)
POTASSIUM SERPL-MCNC: 4.1 MMOL/L — SIGNIFICANT CHANGE UP (ref 3.5–5.3)
POTASSIUM SERPL-SCNC: 4.1 MMOL/L — SIGNIFICANT CHANGE UP (ref 3.5–5.3)
RBC # BLD: 4.41 M/UL — SIGNIFICANT CHANGE UP (ref 3.8–5.2)
RBC # FLD: 14.3 % — SIGNIFICANT CHANGE UP (ref 10.3–16.9)
SODIUM SERPL-SCNC: 139 MMOL/L — SIGNIFICANT CHANGE UP (ref 135–145)
SPECIMEN SOURCE: SIGNIFICANT CHANGE UP
WBC # BLD: 9.1 K/UL — SIGNIFICANT CHANGE UP (ref 3.8–10.5)
WBC # FLD AUTO: 9.1 K/UL — SIGNIFICANT CHANGE UP (ref 3.8–10.5)

## 2017-09-23 PROCEDURE — 84100 ASSAY OF PHOSPHORUS: CPT

## 2017-09-23 PROCEDURE — 83605 ASSAY OF LACTIC ACID: CPT

## 2017-09-23 PROCEDURE — 99239 HOSP IP/OBS DSCHRG MGMT >30: CPT

## 2017-09-23 PROCEDURE — C1889: CPT

## 2017-09-23 PROCEDURE — 86803 HEPATITIS C AB TEST: CPT

## 2017-09-23 PROCEDURE — 85610 PROTHROMBIN TIME: CPT

## 2017-09-23 PROCEDURE — 86850 RBC ANTIBODY SCREEN: CPT

## 2017-09-23 PROCEDURE — 71045 X-RAY EXAM CHEST 1 VIEW: CPT

## 2017-09-23 PROCEDURE — 94640 AIRWAY INHALATION TREATMENT: CPT

## 2017-09-23 PROCEDURE — 36415 COLL VENOUS BLD VENIPUNCTURE: CPT

## 2017-09-23 PROCEDURE — 87116 MYCOBACTERIA CULTURE: CPT

## 2017-09-23 PROCEDURE — 71250 CT THORAX DX C-: CPT

## 2017-09-23 PROCEDURE — 36215 PLACE CATHETER IN ARTERY: CPT | Mod: 59

## 2017-09-23 PROCEDURE — 85730 THROMBOPLASTIN TIME PARTIAL: CPT

## 2017-09-23 PROCEDURE — 93005 ELECTROCARDIOGRAM TRACING: CPT

## 2017-09-23 PROCEDURE — 87015 SPECIMEN INFECT AGNT CONCNTJ: CPT

## 2017-09-23 PROCEDURE — 99285 EMERGENCY DEPT VISIT HI MDM: CPT | Mod: 25

## 2017-09-23 PROCEDURE — 86900 BLOOD TYPING SEROLOGIC ABO: CPT

## 2017-09-23 PROCEDURE — 80053 COMPREHEN METABOLIC PANEL: CPT

## 2017-09-23 PROCEDURE — 37244 VASC EMBOLIZE/OCCLUDE BLEED: CPT

## 2017-09-23 PROCEDURE — C1887: CPT

## 2017-09-23 PROCEDURE — 85027 COMPLETE CBC AUTOMATED: CPT

## 2017-09-23 PROCEDURE — 83735 ASSAY OF MAGNESIUM: CPT

## 2017-09-23 PROCEDURE — 87206 SMEAR FLUORESCENT/ACID STAI: CPT

## 2017-09-23 PROCEDURE — 86901 BLOOD TYPING SEROLOGIC RH(D): CPT

## 2017-09-23 PROCEDURE — 80048 BASIC METABOLIC PNL TOTAL CA: CPT

## 2017-09-23 PROCEDURE — 87040 BLOOD CULTURE FOR BACTERIA: CPT

## 2017-09-23 PROCEDURE — C1894: CPT

## 2017-09-23 PROCEDURE — 71275 CT ANGIOGRAPHY CHEST: CPT

## 2017-09-23 PROCEDURE — 93306 TTE W/DOPPLER COMPLETE: CPT

## 2017-09-23 PROCEDURE — 99232 SBSQ HOSP IP/OBS MODERATE 35: CPT

## 2017-09-23 PROCEDURE — 85025 COMPLETE CBC W/AUTO DIFF WBC: CPT

## 2017-09-23 PROCEDURE — C1769: CPT

## 2017-09-23 RX ADMIN — OXYCODONE AND ACETAMINOPHEN 1 TABLET(S): 5; 325 TABLET ORAL at 09:23

## 2017-09-23 RX ADMIN — PANTOPRAZOLE SODIUM 40 MILLIGRAM(S): 20 TABLET, DELAYED RELEASE ORAL at 06:19

## 2017-09-23 RX ADMIN — OXYCODONE AND ACETAMINOPHEN 1 TABLET(S): 5; 325 TABLET ORAL at 10:00

## 2017-09-23 NOTE — PROGRESS NOTE ADULT - SUBJECTIVE AND OBJECTIVE BOX
Patient is a 62y old  Female who presents with a chief complaint of Hemoptysis (22 Sep 2017 15:30)      24 hour events:     ROS:    Vital Signs Last 24 Hrs  T(C): 36.9 (23 Sep 2017 05:41), Max: 36.9 (23 Sep 2017 05:41)  T(F): 98.4 (23 Sep 2017 05:41), Max: 98.4 (23 Sep 2017 05:41)  HR: 61 (23 Sep 2017 05:41) (60 - 73)  BP: 100/71 (23 Sep 2017 05:41) (100/71 - 132/84)  BP(mean): --  RR: 16 (23 Sep 2017 05:41) (16 - 16)  SpO2: 97% (23 Sep 2017 05:41) (93% - 98%)  I&O's Summary    CAPILLARY BLOOD GLUCOSE        PHYSICAL EXAM:      Constitutional:    Eyes:    ENMT:    Neck:    Breasts:    Back:    Respiratory:    Cardiovascular:    Gastrointestinal:    Genitourinary:    Rectal:    Extremities:    Vascular:    Neurological:    Skin:    Lymph Nodes:    Musculoskeletal:    Psychiatric:                              13.5   9.1   )-----------( 181      ( 23 Sep 2017 06:48 )             39.9     09-23    139  |  101  |  13  ----------------------------<  147<H>  4.1   |  24  |  0.63    Ca    9.5      23 Sep 2017 06:47  Mg     2.1     09-23      Imaging:   CTA  1. Prominent vessel traveling in cephalocaudal dimension along the right   mediastinal pleural interface extending adjacent to an area of traction   and cystic bronchiectasis. Its unclear as to whether this represents a   subsegmental branch of the right superior pulmonary vein versus an   ectatic bronchial artery. There is adjacent streak artifact from bolus   contrast in the superior vena cava which limits evaluation of the origin   of this vessel. If there is continued clinical concern for hemoptysis   secondary to bronchial artery ectasia bronchial artery angiography and   possible embolization may be in order.  2. No significant interval change from thoracic CT dated 9/21/2017 with   marked traction and cystic bronchiectasis involving the right upper lobe   with associated right upper lobe volume loss consistent with the sequela   of chronic remote inflammation.  3. The tracheal bronchial tree is patent.  4. 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     MEDICATIONS  (STANDING):  pantoprazole    Tablet 40 milliGRAM(s) Oral before breakfast  sodium chloride 3%  Inhalation 5 milliLiter(s) Inhalation once    MEDICATIONS  (PRN):  acetaminophen   Tablet. 650 milliGRAM(s) Oral every 6 hours PRN Moderate Pain (4 - 6)  oxyCODONE    5 mG/acetaminophen 325 mG 1 Tablet(s) Oral every 6 hours PRN Severe Pain (7 - 10)      This is a 62y Female with a history of Bronchiectasis  Pulmonary hypertension   admitted for HEMOPTYSIS  .  HEALTH ISSUES - PROBLEM Dx:  Other chest pain: Other chest pain  Prophylactic measure: Prophylactic measure - SCDs  Bronchiectasis: Bronchiectasis  Pulmonary hypertension: Pulmonary hypertension  Hemoptysis: Hemoptysis Patient is a 62y old  Female who presents with a chief complaint of Hemoptysis (22 Sep 2017 15:30)      24 hour events: IR procedure    ROS: c/o cough a/w pleuritic CP and sputum but no hemoptysis, pain at site of R fem artery puncture    Vital Signs Last 24 Hrs  T(C): 36.9 (23 Sep 2017 05:41), Max: 36.9 (23 Sep 2017 05:41)  T(F): 98.4 (23 Sep 2017 05:41), Max: 98.4 (23 Sep 2017 05:41)  HR: 61 (23 Sep 2017 05:41) (60 - 73)  BP: 100/71 (23 Sep 2017 05:41) (100/71 - 132/84)  BP(mean): --  RR: 16 (23 Sep 2017 05:41) (16 - 16)  SpO2: 97% (23 Sep 2017 05:41) (93% - 98%)  I&O's Summary    CAPILLARY BLOOD GLUCOSE        PHYSICAL EXAM:      Constitutional: NAD    Respiratory: CTAB    Cardiovascular: RRR                            13.5   9.1   )-----------( 181      ( 23 Sep 2017 06:48 )             39.9     09-23    139  |  101  |  13  ----------------------------<  147<H>  4.1   |  24  |  0.63    Ca    9.5      23 Sep 2017 06:47  Mg     2.1     09-23      Imaging:   CTA  1. Prominent vessel traveling in cephalocaudal dimension along the right   mediastinal pleural interface extending adjacent to an area of traction   and cystic bronchiectasis. Its unclear as to whether this represents a   subsegmental branch of the right superior pulmonary vein versus an   ectatic bronchial artery. There is adjacent streak artifact from bolus   contrast in the superior vena cava which limits evaluation of the origin   of this vessel. If there is continued clinical concern for hemoptysis   secondary to bronchial artery ectasia bronchial artery angiography and   possible embolization may be in order.  2. No significant interval change from thoracic CT dated 9/21/2017 with   marked traction and cystic bronchiectasis involving the right upper lobe   with associated right upper lobe volume loss consistent with the sequela   of chronic remote inflammation.  3. The tracheal bronchial tree is patent.  4. 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     MEDICATIONS  (STANDING):  pantoprazole    Tablet 40 milliGRAM(s) Oral before breakfast  sodium chloride 3%  Inhalation 5 milliLiter(s) Inhalation once    MEDICATIONS  (PRN):  acetaminophen   Tablet. 650 milliGRAM(s) Oral every 6 hours PRN Moderate Pain (4 - 6)  oxyCODONE    5 mG/acetaminophen 325 mG 1 Tablet(s) Oral every 6 hours PRN Severe Pain (7 - 10)      This is a 62y Female with a history of Bronchiectasis  Pulmonary hypertension   admitted for HEMOPTYSIS  .  HEALTH ISSUES - PROBLEM Dx:  Other chest pain: Other chest pain - 2/2 cough, percocet prn  Prophylactic measure: Prophylactic measure - SCDs  Bronchiectasis: Bronchiectasis - care per pulm, no need for O2  Pulmonary hypertension: Pulmonary hypertension - 2/2 bronchiectasis, tx underlying illness  Hemoptysis: Hemoptysis - subjectively resolved, Hb stable possibly 2/2 IR embolization

## 2017-09-23 NOTE — PROGRESS NOTE ADULT - SUBJECTIVE AND OBJECTIVE BOX
Interval Events:  Patient seen and examined at bedside.    REVIEW OF SYSTEMS:  Constitutional: No fever, weight loss or fatigue  Respiratory: As per subjective  Cardiovascular: No chest pain, palpitations, dizziness or leg swelling  Gastrointestinal: No abdominal or epigastric pain. No nausea, vomiting or hematemesis; No diarrhea or constipation. No melena or hematochezia.  Neurological: No headaches, memory loss, loss of strength, numbness or tremors  Skin: No itching, burning, rashes or lesions     MEDICATIONS:  Pulmonary:  sodium chloride 3%  Inhalation 5 milliLiter(s) Inhalation once    Antimicrobials:    Anticoagulants:    Cardiac:      Allergies    No Known Allergies    Intolerances        Vital Signs Last 24 Hrs  T(C): 36.9 (23 Sep 2017 05:41), Max: 36.9 (23 Sep 2017 05:41)  T(F): 98.4 (23 Sep 2017 05:41), Max: 98.4 (23 Sep 2017 05:41)  HR: 61 (23 Sep 2017 05:41) (60 - 73)  BP: 100/71 (23 Sep 2017 05:41) (100/71 - 132/84)  BP(mean): --  RR: 16 (23 Sep 2017 05:41) (16 - 16)  SpO2: 97% (23 Sep 2017 05:41) (93% - 98%)        PHYSICAL EXAM:    General: Well developed; well nourished; in no acute distress  Eyes: PERRL, EOM intact; conjunctiva and sclera clear  ENMT: No nasal discharge; airway clear  Neck: Supple; non tender; no masses  Respiratory: Clear to auscultation bilaterally without wheezing, rhonchi or rales  Cardiovascular: Regular rate and rhythm. S1 and S2 Normal; No murmurs, gallops or rubs  Gastrointestinal: Soft non-tender non-distended; Normal bowel sounds  Extremities: Normal range of motion, No clubbing, cyanosis or edema  Neurological: Alert and oriented x3  Skin: Warm and dry. No obvious rash    LABS:      CBC Full  -  ( 23 Sep 2017 06:48 )  WBC Count : 9.1 K/uL  Hemoglobin : 13.5 g/dL  Hematocrit : 39.9 %  Platelet Count - Automated : 181 K/uL  Mean Cell Volume : 90.5 fL  Mean Cell Hemoglobin : 30.6 pg  Mean Cell Hemoglobin Concentration : 33.8 g/dL    09-23    139  |  101  |  13  ----------------------------<  147<H>  4.1   |  24  |  0.63    Ca    9.5      23 Sep 2017 06:47  Mg     2.1     09-23      RADIOLOGY & ADDITIONAL STUDIES (The following images were personally reviewed):

## 2017-09-23 NOTE — PROGRESS NOTE ADULT - PROBLEM SELECTOR PLAN 1
- patient presenting with two episodes of hemoptysis and pleuritic chest pain; h/o previous episode in March 2017 with unknown w/u for TB.   - Hemoptysis likely 2/2 bronchiectasis vs ARABELLA; although TB cannot be ruled out given pt with previous contact with TB positive person and travel to TB endemic area in March 2017, when pt had first episode of hemoptysis.  - Patient also w with recent diagnosis of bronchiectasis and pHTN in March 2017  - CXR unremarkable  - CT scan consistent with bronchiectasis; although suboptimal study for vessels  - CTA chest showing a prominent vessel along the right mediastinal pleural interface extending adjacent to an area of traction and cystic bronchiectasis. May plan for bronchial a angiography with potential embolization.  - Airborne Isolation  - f/u sputum AFB x3 and gram stain- AFB negative x2, f/u other two  - f/u Quantiferon gold  - bcxs negative - patient presenting with two episodes of hemoptysis and pleuritic chest pain; h/o previous episode in March 2017 with unknown w/u for TB.   - Hemoptysis likely 2/2 bronchiectasis vs ARABELLA; although TB cannot be ruled out given pt with previous contact with TB positive person and travel to TB endemic area in March 2017, when pt had first episode of hemoptysis.  - Patient also w with recent diagnosis of bronchiectasis and pHTN in March 2017  - CXR unremarkable  - CT scan consistent with bronchiectasis; although suboptimal study for vessels  - CTA chest showing a prominent vessel along the right mediastinal pleural interface extending adjacent to an area of traction and cystic bronchiectasis.   - s/p angio with ?bronchial a embolization; will f/u  with IR on the outcome of the angiography and possible embolization of the RUL  - Airborne Isolation  - f/u sputum AFB x3 and gram stain- AFB negative x2, f/u other two  - f/u Quantiferon gold  - bcxs negative - patient presenting with two episodes of hemoptysis and pleuritic chest pain; h/o previous episode in March 2017 with unknown w/u for TB.   - Hemoptysis likely 2/2 bronchiectasis vs ARABELLA; although TB cannot be ruled out given pt with previous contact with TB positive person and travel to TB endemic area in March 2017, when pt had first episode of hemoptysis.  - Patient also w with recent diagnosis of bronchiectasis and pHTN in March 2017  - CXR unremarkable  - CT scan consistent with bronchiectasis; although suboptimal study for vessels  - CTA chest showing a prominent vessel along the right mediastinal pleural interface extending adjacent to an area of traction and cystic bronchiectasis.   - s/p angio with bronchial a embolization; will f/u  with IR on the outcome of the angiography and embolization of the RUL  - Airborne Isolation  - f/u sputum AFB x3 and gram stain- AFB negative x2, f/u other two  - f/u Quantiferon gold  - bcxs negative

## 2017-09-23 NOTE — PROGRESS NOTE ADULT - PROBLEM SELECTOR PLAN 2
- Pt w/ reported hx of pulmonary hypertension for which she is not on any medication. Follows up with Dr. Chaidez outpatient.   - Likely idiopathic vs secondary to intrinsic lung pathology; less likely Category 2 pHTN   - ECHO showing normal LV, LVEF 55%, pHTN with pulmonary artery   systolic pressure estimated to be 20 mmHg  - outpatient management

## 2017-09-23 NOTE — PROGRESS NOTE ADULT - ASSESSMENT
61 y/o F w/ a PMHx of pulmonary hypertension and bronchiectasis (recently diagnosed in [facility] 2017) presenting w few day history of pleuritic chest pain and two episodes of hemoptysis, admitted to [facility] for r/o TB.
63 y/o F w/ a PMHx of pulmonary hypertension and bronchiectasis (recently diagnosed in [facility] 2017) presenting w few day history of pleuritic chest pain and two episodes of hemoptysis, admitted to [facility] for r/o TB.
63 y/o F w/ a PMHx of pulmonary hypertension and bronchiectasis (recently diagnosed in [facility] 2017) presenting w few day history of pleuritic chest pain and two episodes of hemoptysis, admitted to [facility] for r/o TB.
63y/o woman hx of bronchiectasis and PH. Admitted here for hemoptysis, she has had hx of hemoptysis in march 2017 that was quite significant. Possible TB exposure in the past with her grandmother dying of a lung disease. She is S/P angiogram and ?bronchial artery embolization.
61y/o woman hx of bronchiectasis and PH. Admitted here for hemoptysis, she has had hx of hemoptysis in march 2017 that was quite significant. Possible TB exposure in the past with her grandmother dying of a lung disease.
63y/o woman hx of bronchiectasis and PH. Admitted here for hemoptysis, she has had hx of hemoptysis in march 2017 that was quite significant. Possible TB exposure in the past with her grandmother dying of a lung disease.

## 2017-09-23 NOTE — PROGRESS NOTE ADULT - SUBJECTIVE AND OBJECTIVE BOX
PGY1 PROGRESS NOTE:    OVERNIGHT EVENTS: No acute overnight events.    SUBJECTIVE / INTERVAL HPI: Three sputums were collected from patient and awaiting AFB results. AFB negative x2. Patient had CTA Chest yesterday which showed a prominent vessel along the right mediastinal pleural interface extending adjacent to an area of traction and cystic bronchiectasis. May consider bronchial artery angiography today for diagnosis of bleeding vessel and possible embolization. Patient seen and examined at bedside.       VITAL SIGNS:      PHYSICAL EXAM:  General: WDWN, resting in bed comfortably, NAD  HEENT: NC/AT; PERRL, anicteric sclera; MMM  Neck: supple, no LAD  Cardiovascular: +S1/S2; RRR; splitting of S2 on inspiration  Respiratory: good air entry, CTA B/L; no W/R/R  Gastrointestinal: soft, NT/ND; +BSx4  Extremities: WWP; no edema, clubbing or cyanosis  Vascular: 2+ radial, DP/PT pulses B/L  Neurological: AAOx3; no focal deficits    MEDICATIONS:  MEDICATIONS  (STANDING):  pantoprazole    Tablet 40 milliGRAM(s) Oral before breakfast    MEDICATIONS  (PRN):  acetaminophen   Tablet. 650 milliGRAM(s) Oral every 6 hours PRN Moderate Pain (4 - 6)  oxyCODONE    5 mG/acetaminophen 325 mG 1 Tablet(s) Oral every 6 hours PRN Severe Pain (7 - 10)    ALLERGIES:  No Known Allergies    LABS:                    RADIOLOGY & ADDITIONAL TESTS: Reviewed. PGY1 PROGRESS NOTE:    OVERNIGHT EVENTS: No acute overnight events.    SUBJECTIVE / INTERVAL HPI: Three sputums were collected from patient and awaiting AFB results. AFB negative x2. Patient had CTA Chest yesterday which showed a prominent vessel along the right mediastinal pleural interface extending adjacent to an area of traction and cystic bronchiectasis. Had bronchial artery angiography today for diagnosis of bleeding vessel and embolization????. Patient seen and examined at bedside.       VITAL SIGNS:  Vital Signs Last 24 Hrs  T(C): 36.9 (23 Sep 2017 05:41), Max: 36.9 (23 Sep 2017 05:41)  T(F): 98.4 (23 Sep 2017 05:41), Max: 98.4 (23 Sep 2017 05:41)  HR: 61 (23 Sep 2017 05:41) (60 - 73)  BP: 100/71 (23 Sep 2017 05:41) (100/71 - 132/84)  RR: 16 (23 Sep 2017 05:41) (16 - 16)  SpO2: 97% (23 Sep 2017 05:41) (93% - 98%)    PHYSICAL EXAM:  General: WDWN, resting in bed comfortably, NAD  HEENT: NC/AT; PERRL, anicteric sclera; MMM  Neck: supple, no LAD  Cardiovascular: +S1/S2; RRR; splitting of S2 on inspiration  Respiratory: good air entry, CTA B/L; no W/R/R  Gastrointestinal: soft, NT/ND; +BSx4  Extremities: WWP; no edema, clubbing or cyanosis  Vascular: 2+ radial, DP/PT pulses B/L  Neurological: AAOx3; no focal deficits    MEDICATIONS:  MEDICATIONS  (STANDING):  pantoprazole    Tablet 40 milliGRAM(s) Oral before breakfast    MEDICATIONS  (PRN):  acetaminophen   Tablet. 650 milliGRAM(s) Oral every 6 hours PRN Moderate Pain (4 - 6)  oxyCODONE    5 mG/acetaminophen 325 mG 1 Tablet(s) Oral every 6 hours PRN Severe Pain (7 - 10)    ALLERGIES:  No Known Allergies    LABS:                 09-23    139  |  101  |  13  ----------------------------<  147<H>  4.1   |  24  |  0.63    Ca    9.5      23 Sep 2017 06:47  Mg     2.1     09-23             RADIOLOGY & ADDITIONAL TESTS: Reviewed. PGY1 PROGRESS NOTE:    OVERNIGHT EVENTS: No acute overnight events.    SUBJECTIVE / INTERVAL HPI: Three sputums were collected from patient and awaiting AFB results. AFB negative x2. Patient had CTA Chest yesterday which showed a prominent vessel along the right mediastinal pleural interface extending adjacent to an area of traction and cystic bronchiectasis. Had bronchial artery angiography yesterday for diagnosis of bleeding vessel and ?embolization. Patient seen and examined at bedside. Patient with some mild pain at the femoral surgical site. Patient still c/o cough with sputum, no blood, and some right sided pleuritic chest pain. Otherwise denies SOB, fevers, chills, night sweats, loss of appetite, abdominal pain, n/v/d.     VITAL SIGNS:  Vital Signs Last 24 Hrs  T(C): 36.9 (23 Sep 2017 05:41), Max: 36.9 (23 Sep 2017 05:41)  T(F): 98.4 (23 Sep 2017 05:41), Max: 98.4 (23 Sep 2017 05:41)  HR: 61 (23 Sep 2017 05:41) (60 - 73)  BP: 100/71 (23 Sep 2017 05:41) (100/71 - 132/84)  RR: 16 (23 Sep 2017 05:41) (16 - 16)  SpO2: 97% (23 Sep 2017 05:41) (93% - 98%)    PHYSICAL EXAM:  General: WDWN, resting in bed comfortably, NAD  HEENT: NC/AT; PERRL, anicteric sclera; MMM  Neck: supple, no LAD  Cardiovascular: +S1/S2; RRR; splitting of S2 on inspiration  Respiratory: good air entry, CTA B/L; no W/R/R  Gastrointestinal: soft, NT/ND; +BSx4  Extremities: WWP; no edema, clubbing or cyanosis  Vascular: 2+ radial, DP/PT pulses B/L  Neurological: AAOx3; no focal deficits    MEDICATIONS:  MEDICATIONS  (STANDING):  pantoprazole    Tablet 40 milliGRAM(s) Oral before breakfast    MEDICATIONS  (PRN):  acetaminophen   Tablet. 650 milliGRAM(s) Oral every 6 hours PRN Moderate Pain (4 - 6)  oxyCODONE    5 mG/acetaminophen 325 mG 1 Tablet(s) Oral every 6 hours PRN Severe Pain (7 - 10)    ALLERGIES:  No Known Allergies    LABS:      09-23    139  |  101  |  13  ----------------------------<  147<H>  4.1   |  24  |  0.63    Ca    9.5      23 Sep 2017 06:47  Mg     2.1     09-23        RADIOLOGY & ADDITIONAL TESTS: Reviewed.

## 2017-09-23 NOTE — PROGRESS NOTE ADULT - PROBLEM SELECTOR PLAN 3
- Pt recent diagnosis of bronchiectasis in March 2017 based on CT chest findings, follows with Dr. Chaidez outpatient.    - CT scan showing marked volume loss of the right upper lobe with associated cystic bronchiectasis. (no previous inpatient imaging for comparison)  - Bronchiectasis could be a source of hemoptysis, possibly 2/2 ARABELLA vs unlikely TB  - continue with hemoptysis w/u as above

## 2017-09-23 NOTE — PROGRESS NOTE ADULT - SUBJECTIVE AND OBJECTIVE BOX
61y/o woman hx of bronchiectasis and PH. Admitted here for hemoptysis s/p bronchial artery embolization by IR on 9/22    No issues overnight. No hemoptysis or SOB. Breathing comfortably on RA on exam.     Will continue to monitor while inpatient.

## 2017-09-25 LAB
CULTURE RESULTS: SIGNIFICANT CHANGE UP
CULTURE RESULTS: SIGNIFICANT CHANGE UP
M TB TUBERC IFN-G BLD QL: 0.04 IU/ML — SIGNIFICANT CHANGE UP
M TB TUBERC IFN-G BLD QL: 0.48 IU/ML — SIGNIFICANT CHANGE UP
M TB TUBERC IFN-G BLD QL: POSITIVE
MITOGEN IGNF BCKGRD COR BLD-ACNC: >10 IU/ML — SIGNIFICANT CHANGE UP
SPECIMEN SOURCE: SIGNIFICANT CHANGE UP
SPECIMEN SOURCE: SIGNIFICANT CHANGE UP

## 2017-09-26 DIAGNOSIS — R04.2 HEMOPTYSIS: ICD-10-CM

## 2017-09-26 DIAGNOSIS — J47.9 BRONCHIECTASIS, UNCOMPLICATED: ICD-10-CM

## 2017-09-26 DIAGNOSIS — I27.2 OTHER SECONDARY PULMONARY HYPERTENSION: ICD-10-CM

## 2017-09-26 DIAGNOSIS — A31.0 PULMONARY MYCOBACTERIAL INFECTION: ICD-10-CM

## 2017-09-26 DIAGNOSIS — Z20.1 CONTACT WITH AND (SUSPECTED) EXPOSURE TO TUBERCULOSIS: ICD-10-CM

## 2017-09-27 ENCOUNTER — APPOINTMENT (OUTPATIENT)
Dept: PULMONOLOGY | Facility: CLINIC | Age: 62
End: 2017-09-27
Payer: COMMERCIAL

## 2017-09-27 VITALS
WEIGHT: 150 LBS | OXYGEN SATURATION: 96 % | HEART RATE: 89 BPM | DIASTOLIC BLOOD PRESSURE: 74 MMHG | SYSTOLIC BLOOD PRESSURE: 110 MMHG

## 2017-09-27 DIAGNOSIS — Z86.69 PERSONAL HISTORY OF OTHER DISEASES OF THE NERVOUS SYSTEM AND SENSE ORGANS: ICD-10-CM

## 2017-09-27 DIAGNOSIS — Z80.42 FAMILY HISTORY OF MALIGNANT NEOPLASM OF PROSTATE: ICD-10-CM

## 2017-09-27 DIAGNOSIS — Z86.19 PERSONAL HISTORY OF OTHER INFECTIOUS AND PARASITIC DISEASES: ICD-10-CM

## 2017-09-27 DIAGNOSIS — J47.9 BRONCHIECTASIS, UNCOMPLICATED: ICD-10-CM

## 2017-09-27 DIAGNOSIS — A31.0 PULMONARY MYCOBACTERIAL INFECTION: ICD-10-CM

## 2017-09-27 DIAGNOSIS — Z83.1 FAMILY HISTORY OF OTHER INFECTIOUS AND PARASITIC DISEASES: ICD-10-CM

## 2017-09-27 PROCEDURE — 99215 OFFICE O/P EST HI 40 MIN: CPT

## 2017-09-27 RX ORDER — OMEPRAZOLE 20 MG/1
20 CAPSULE, DELAYED RELEASE ORAL
Refills: 0 | Status: ACTIVE | COMMUNITY

## 2017-11-06 ENCOUNTER — MOBILE ON CALL (OUTPATIENT)
Age: 62
End: 2017-11-06

## 2017-11-08 LAB
CULTURE RESULTS: SIGNIFICANT CHANGE UP
SPECIMEN SOURCE: SIGNIFICANT CHANGE UP
